# Patient Record
Sex: MALE | Race: WHITE | NOT HISPANIC OR LATINO | Employment: FULL TIME | URBAN - METROPOLITAN AREA
[De-identification: names, ages, dates, MRNs, and addresses within clinical notes are randomized per-mention and may not be internally consistent; named-entity substitution may affect disease eponyms.]

---

## 2024-10-02 ENCOUNTER — HOSPITAL ENCOUNTER (EMERGENCY)
Facility: HOSPITAL | Age: 49
Discharge: HOME | End: 2024-10-02
Attending: EMERGENCY MEDICINE
Payer: COMMERCIAL

## 2024-10-02 VITALS
BODY MASS INDEX: 38.43 KG/M2 | DIASTOLIC BLOOD PRESSURE: 80 MMHG | OXYGEN SATURATION: 100 % | SYSTOLIC BLOOD PRESSURE: 135 MMHG | WEIGHT: 290 LBS | HEIGHT: 73 IN | RESPIRATION RATE: 18 BRPM | HEART RATE: 84 BPM | TEMPERATURE: 98 F

## 2024-10-02 DIAGNOSIS — T14.8XXA SKIN AVULSION: Primary | ICD-10-CM

## 2024-10-02 DIAGNOSIS — S69.92XA FINGERNAIL INJURY, LEFT, INITIAL ENCOUNTER: ICD-10-CM

## 2024-10-02 PROCEDURE — 63600175 PHARM REV CODE 636 W HCPCS: Mod: ER | Performed by: NURSE PRACTITIONER

## 2024-10-02 PROCEDURE — 90715 TDAP VACCINE 7 YRS/> IM: CPT | Mod: ER | Performed by: NURSE PRACTITIONER

## 2024-10-02 PROCEDURE — 90471 IMMUNIZATION ADMIN: CPT | Mod: ER | Performed by: NURSE PRACTITIONER

## 2024-10-02 PROCEDURE — 12001 RPR S/N/AX/GEN/TRNK 2.5CM/<: CPT | Mod: ER

## 2024-10-02 PROCEDURE — 99284 EMERGENCY DEPT VISIT MOD MDM: CPT | Mod: 25,ER

## 2024-10-02 RX ORDER — LIDOCAINE HYDROCHLORIDE 10 MG/ML
10 INJECTION, SOLUTION INFILTRATION; PERINEURAL
Status: COMPLETED | OUTPATIENT
Start: 2024-10-02 | End: 2024-10-02

## 2024-10-02 RX ADMIN — TETANUS TOXOID, REDUCED DIPHTHERIA TOXOID AND ACELLULAR PERTUSSIS VACCINE, ADSORBED 0.5 ML: 5; 2.5; 8; 8; 2.5 SUSPENSION INTRAMUSCULAR at 11:10

## 2024-10-02 RX ADMIN — LIDOCAINE HYDROCHLORIDE 10 ML: 10 INJECTION, SOLUTION INFILTRATION; PERINEURAL at 10:10

## 2024-10-02 NOTE — ED NOTES
Pt presents to ED c/o laceration to left pointer finger, pt reports cutting finger on saw and felt saw touching the bone. Bleeding controlled. Pt is not up to date on tetanus shot.

## 2024-10-02 NOTE — ED PROVIDER NOTES
Encounter Date: 10/2/2024       History     Chief Complaint   Patient presents with    Laceration     A 47 y/o male presents to the ER c/o laceration to (left) hand index finger. Pt reports cutting his finger with table saw. Dressing in place on arrival.      Chief complaint:  Left index finger injury     History of present illness: Patient is a 48-year-old male who states that he was using a table saw when his left index finger impacted the blade which was spinning at that time.  He is injury to the nail as well as to the end of the finger.  Denies any numbness or tingling.  He is unsure of his tetanus status.    The history is provided by the patient. No  was used.     Review of patient's allergies indicates:  No Known Allergies  No past medical history on file.  No past surgical history on file.  No family history on file.     Review of Systems   Skin:  Positive for wound.   All other systems reviewed and are negative.      Physical Exam     Initial Vitals [10/02/24 1006]   BP Pulse Resp Temp SpO2   137/76 90 18 98.3 °F (36.8 °C) 98 %      MAP       --         Physical Exam    Nursing note and vitals reviewed.  Constitutional: He appears well-developed and well-nourished. He is not diaphoretic. No distress.   HENT:   Head: Normocephalic and atraumatic.   Right Ear: External ear normal.   Left Ear: External ear normal.   Nose: Nose normal.   Eyes: Pupils are equal, round, and reactive to light. Right eye exhibits no discharge. Left eye exhibits no discharge. No scleral icterus.   Neck:   Normal range of motion.  Pulmonary/Chest: No respiratory distress.   Abdominal: He exhibits no distension.   Musculoskeletal:         General: Normal range of motion.      Cervical back: Normal range of motion.     Neurological: He is alert and oriented to person, place, and time.   Skin: Skin is dry. Capillary refill takes less than 2 seconds.   Left index finger with an avulsion to the lateral aspect with  some damage to the nail.  Scant active bleeding.  Cap refill surrounding less than 2 seconds sensation intact surrounding.         ED Course   Lac Repair    Date/Time: 10/2/2024 12:16 PM    Performed by: Greyson Page DNP  Authorized by: Rowena Maguire DO    Laceration details:     Location:  Finger    Finger location:  L index finger    Length (cm):  2  Skin repair:     Repair method:  Tissue adhesive  Approximation:     Approximation:  Close  Repair type:     Repair type:  Simple  Post-procedure details:     Dressing:  Non-adherent dressing    Procedure completion:  Tolerated well, no immediate complications    Labs Reviewed - No data to display       Imaging Results              X-Ray Finger 2 or More Views Left (Final result)  Result time 10/02/24 11:35:53   Procedure changed from X-Ray Hand 3 view Left     Final result by Noel Bautista III, MD (10/02/24 11:35:53)                   Narrative:    EXAMINATION:  XR FINGER 2 OR MORE VIEWS LEFT    CLINICAL HISTORY:  lac to left index finger;    FINDINGS:  Finger three views left.    No fracture, dislocation, or bone destruction seen.  No acute process seen.      Electronically signed by: Noel Bautista MD  Date:    10/02/2024  Time:    11:35                                     Medications   LIDOcaine HCL 10 mg/ml (1%) injection 10 mL (10 mLs Infiltration Given by Other 10/2/24 1029)   Tdap (BOOSTRIX) vaccine injection 0.5 mL (0.5 mLs Intramuscular Given 10/2/24 1123)     Medical Decision Making  Patient is a 48-year-old male who states that he was using a table saw when his left index finger impacted the blade which was spinning at that time.  He is injury to the nail as well as to the end of the finger.  Denies any numbness or tingling.  He is unsure of his tetanus status.    On physical exam, the patient is Sitting in chair at bedside.  Respiratory effort is unlabored. Extremities: Left index finger with an avulsion to the lateral aspect with some damage  "to the nail.  Scant active bleeding.  Cap refill surrounding less than 2 seconds sensation intact surrounding.     Differential diagnoses includes but is not limited to laceration wound infection subungual hematoma tetanus retained foreign body fracture          Problems Addressed:  Fingernail injury, left, initial encounter: acute illness or injury  Skin avulsion: acute illness or injury     Details: Following cleansing of the entire region a tourniquet was applied tissue adhesive was applied to the entire wound area.  I did not feel it was necessary to remove the nail as it was a concise nail injury without possibility of undermining there was no subungual hematoma.  Patient was discharged to use Tylenol and ibuprofen and Boostrix was provided.    Amount and/or Complexity of Data Reviewed  Radiology: ordered.  Discussion of management or test interpretation with external provider(s): Vital signs at the time of disposition were:  /80   Pulse 84   Temp 98.3 °F (36.8 °C) (Oral)   Resp 18   Ht 6' 1" (1.854 m)   Wt 131.5 kg (290 lb)   SpO2 100%   BMI 38.26 kg/m²       See AVS for additional recommendations. Medications listed herein were prescribed after reviewing the patient's allergies, medication list, history, most recent laboratories as available.  Referrals below were provided after reviewing the patient's previous medical providers. He understands he  should return for any worsening or changes in condition.  Prior to discharge the patient was asked if he  had any additional concerns or complaints and he declined. The patient was given an opportunity to ask questions and all were answered to his satisfaction.     Risk  Prescription drug management.  Diagnosis or treatment significantly limited by social determinants of health.               ED Course as of 10/02/24 1217   Wed Oct 02, 2024   1051 BP: 137/76 [VC]   1051 Temp: 98.3 °F (36.8 °C) [VC]   1051 Temp Source: Oral [VC]   1051 Pulse: 90 [VC] "   1051 Resp: 18 [VC]   1051 SpO2: 98 % [VC]      ED Course User Index  [VC] Greyson Page DNP                           Clinical Impression:  Final diagnoses:  [T14.8XXA] Skin avulsion (Primary)  [S69.92XA] Fingernail injury, left, initial encounter          ED Disposition Condition    Discharge Stable          ED Prescriptions    None       Follow-up Information       Follow up With Specialties Details Why Contact Info    [facility] -  Schedule an appointment as soon as possible for a visit   [address]  [place] [address]  [phone]               Greyson Page DNP  10/02/24 8133